# Patient Record
Sex: MALE | Race: WHITE | HISPANIC OR LATINO | URBAN - METROPOLITAN AREA
[De-identification: names, ages, dates, MRNs, and addresses within clinical notes are randomized per-mention and may not be internally consistent; named-entity substitution may affect disease eponyms.]

---

## 2018-06-13 ENCOUNTER — OFFICE VISIT (OUTPATIENT)
Dept: URBAN - METROPOLITAN AREA CLINIC 48 | Facility: CLINIC | Age: 78
End: 2018-06-13
Payer: COMMERCIAL

## 2018-06-13 PROCEDURE — 99213 OFFICE O/P EST LOW 20 MIN: CPT | Performed by: OPHTHALMOLOGY

## 2018-06-13 ASSESSMENT — INTRAOCULAR PRESSURE
OS: 14
OD: 13

## 2018-06-13 NOTE — IMPRESSION/PLAN
Impression: Type 2 diabetes mellitus w/o complication: X04.4. Plan: Diabetes type II: no background retinopathy, no signs of neovascularization noted. Discussed ocular and systemic benefits of blood sugar control.

## 2022-03-22 ENCOUNTER — OFFICE VISIT (OUTPATIENT)
Dept: URBAN - METROPOLITAN AREA CLINIC 48 | Facility: CLINIC | Age: 82
End: 2022-03-22
Payer: COMMERCIAL

## 2022-03-22 DIAGNOSIS — Z96.1 PRESENCE OF PSEUDOPHAKIA: ICD-10-CM

## 2022-03-22 DIAGNOSIS — E11.9 TYPE 2 DIABETES MELLITUS W/O COMPLICATION: Primary | ICD-10-CM

## 2022-03-22 DIAGNOSIS — H11.001 UNSPECIFIED PTERYGIUM OF RIGHT EYE: ICD-10-CM

## 2022-03-22 PROCEDURE — 92004 COMPRE OPH EXAM NEW PT 1/>: CPT | Performed by: STUDENT IN AN ORGANIZED HEALTH CARE EDUCATION/TRAINING PROGRAM

## 2022-03-22 ASSESSMENT — INTRAOCULAR PRESSURE
OD: 13
OS: 12

## 2022-03-22 NOTE — IMPRESSION/PLAN
Impression: Presence of pseudophakia: Z96.1. Plan: Doing well
s/p YAG OU
RT/RD precautions RTC 1 year for [facility]

## 2022-03-22 NOTE — IMPRESSION/PLAN
Impression: Unspecified pterygium of right eye: H11.001. Plan: Stable, no complaints at this time Continue to monitor AT prn OU

## 2022-03-22 NOTE — IMPRESSION/PLAN
Impression: Type 2 diabetes mellitus w/o complication: V99.7.  Plan: - No retinopathy seen on exam today
- Continue glucose, BP, and lipid control as per PCP
- Continue with annual DFE

## 2024-11-07 ENCOUNTER — OFFICE VISIT (OUTPATIENT)
Dept: URBAN - METROPOLITAN AREA CLINIC 48 | Facility: CLINIC | Age: 84
End: 2024-11-07
Payer: COMMERCIAL

## 2024-11-07 DIAGNOSIS — E11.9 TYPE 2 DIABETES MELLITUS W/O COMPLICATION: Primary | ICD-10-CM

## 2024-11-07 DIAGNOSIS — H35.372 PUCKERING OF MACULA, LEFT EYE: ICD-10-CM

## 2024-11-07 PROCEDURE — 99214 OFFICE O/P EST MOD 30 MIN: CPT | Performed by: OPHTHALMOLOGY

## 2024-11-07 PROCEDURE — 92134 CPTRZ OPH DX IMG PST SGM RTA: CPT | Performed by: OPHTHALMOLOGY

## 2024-11-07 ASSESSMENT — INTRAOCULAR PRESSURE
OD: 16
OS: 14